# Patient Record
Sex: FEMALE | Race: OTHER | HISPANIC OR LATINO
[De-identification: names, ages, dates, MRNs, and addresses within clinical notes are randomized per-mention and may not be internally consistent; named-entity substitution may affect disease eponyms.]

---

## 2018-02-05 ENCOUNTER — APPOINTMENT (OUTPATIENT)
Dept: ANTEPARTUM | Facility: CLINIC | Age: 38
End: 2018-02-05
Payer: MEDICAID

## 2018-02-05 ENCOUNTER — ASOB RESULT (OUTPATIENT)
Age: 38
End: 2018-02-05

## 2018-02-05 PROBLEM — Z00.00 ENCOUNTER FOR PREVENTIVE HEALTH EXAMINATION: Status: ACTIVE | Noted: 2018-02-05

## 2018-02-05 PROCEDURE — 76816 OB US FOLLOW-UP PER FETUS: CPT

## 2018-03-15 ENCOUNTER — ASOB RESULT (OUTPATIENT)
Age: 38
End: 2018-03-15

## 2018-03-15 ENCOUNTER — APPOINTMENT (OUTPATIENT)
Dept: ANTEPARTUM | Facility: CLINIC | Age: 38
End: 2018-03-15
Payer: MEDICAID

## 2018-03-15 ENCOUNTER — OTHER (OUTPATIENT)
Age: 38
End: 2018-03-15

## 2018-03-15 PROCEDURE — 76811 OB US DETAILED SNGL FETUS: CPT

## 2018-03-15 PROCEDURE — 76817 TRANSVAGINAL US OBSTETRIC: CPT

## 2018-03-19 ENCOUNTER — APPOINTMENT (OUTPATIENT)
Dept: ANTEPARTUM | Facility: CLINIC | Age: 38
End: 2018-03-19

## 2018-04-06 ENCOUNTER — APPOINTMENT (OUTPATIENT)
Dept: PEDIATRIC CARDIOLOGY | Facility: CLINIC | Age: 38
End: 2018-04-06

## 2018-05-01 ENCOUNTER — APPOINTMENT (OUTPATIENT)
Dept: PEDIATRIC CARDIOLOGY | Facility: CLINIC | Age: 38
End: 2018-05-01
Payer: MEDICAID

## 2018-05-01 DIAGNOSIS — O09.529 SUPERVISION OF ELDERLY MULTIGRAVIDA, UNSPECIFIED TRIMESTER: ICD-10-CM

## 2018-05-01 DIAGNOSIS — O35.9XX0 MATERNAL CARE FOR (SUSPECTED) FETAL ABNORMALITY AND DAMAGE, UNSPECIFIED, NOT APPLICABLE OR UNSPECIFIED: ICD-10-CM

## 2018-05-01 DIAGNOSIS — Q27.8 OTHER SPECIFIED CONGENITAL MALFORMATIONS OF PERIPHERAL VASCULAR SYSTEM: ICD-10-CM

## 2018-05-01 PROCEDURE — 76825 ECHO EXAM OF FETAL HEART: CPT

## 2018-05-01 PROCEDURE — 76821 MIDDLE CEREBRAL ARTERY ECHO: CPT

## 2018-05-01 PROCEDURE — 76820 UMBILICAL ARTERY ECHO: CPT

## 2018-05-01 PROCEDURE — 76827 ECHO EXAM OF FETAL HEART: CPT

## 2018-05-01 PROCEDURE — 93325 DOPPLER ECHO COLOR FLOW MAPG: CPT | Mod: 59

## 2018-05-01 PROCEDURE — 99204 OFFICE O/P NEW MOD 45 MIN: CPT | Mod: 25

## 2018-05-01 RX ORDER — ELASTIC BANDAGE 2"X2.2YD
BANDAGE TOPICAL
Refills: 0 | Status: ACTIVE | COMMUNITY

## 2018-05-02 PROBLEM — Q27.8: Status: ACTIVE | Noted: 2018-05-02

## 2018-05-10 ENCOUNTER — ASOB RESULT (OUTPATIENT)
Age: 38
End: 2018-05-10

## 2018-05-10 ENCOUNTER — APPOINTMENT (OUTPATIENT)
Dept: ANTEPARTUM | Facility: CLINIC | Age: 38
End: 2018-05-10
Payer: MEDICAID

## 2018-05-10 PROCEDURE — 76819 FETAL BIOPHYS PROFIL W/O NST: CPT

## 2018-05-10 PROCEDURE — 76816 OB US FOLLOW-UP PER FETUS: CPT

## 2018-06-21 ENCOUNTER — ASOB RESULT (OUTPATIENT)
Age: 38
End: 2018-06-21

## 2018-06-21 ENCOUNTER — APPOINTMENT (OUTPATIENT)
Dept: ANTEPARTUM | Facility: CLINIC | Age: 38
End: 2018-06-21
Payer: MEDICAID

## 2018-06-21 PROCEDURE — 76816 OB US FOLLOW-UP PER FETUS: CPT

## 2018-06-21 PROCEDURE — 76819 FETAL BIOPHYS PROFIL W/O NST: CPT

## 2018-08-02 ENCOUNTER — OUTPATIENT (OUTPATIENT)
Dept: OUTPATIENT SERVICES | Facility: HOSPITAL | Age: 38
LOS: 1 days | End: 2018-08-02
Payer: MEDICAID

## 2018-08-02 ENCOUNTER — TRANSCRIPTION ENCOUNTER (OUTPATIENT)
Age: 38
End: 2018-08-02

## 2018-08-02 DIAGNOSIS — O35.9XX0 MATERNAL CARE FOR (SUSPECTED) FETAL ABNORMALITY AND DAMAGE, UNSPECIFIED, NOT APPLICABLE OR UNSPECIFIED: ICD-10-CM

## 2018-08-02 PROCEDURE — 76815 OB US LIMITED FETUS(S): CPT | Mod: 26

## 2018-08-02 PROCEDURE — 76819 FETAL BIOPHYS PROFIL W/O NST: CPT | Mod: 26

## 2018-08-02 PROCEDURE — 76819 FETAL BIOPHYS PROFIL W/O NST: CPT

## 2018-08-02 PROCEDURE — 76815 OB US LIMITED FETUS(S): CPT

## 2018-08-03 ENCOUNTER — TRANSCRIPTION ENCOUNTER (OUTPATIENT)
Age: 38
End: 2018-08-03

## 2018-08-03 ENCOUNTER — INPATIENT (INPATIENT)
Facility: HOSPITAL | Age: 38
LOS: 1 days | Discharge: HOME | End: 2018-08-05
Attending: OBSTETRICS & GYNECOLOGY | Admitting: OBSTETRICS & GYNECOLOGY
Payer: MEDICAID

## 2018-08-03 VITALS — WEIGHT: 165.35 LBS | HEIGHT: 61 IN

## 2018-08-03 DIAGNOSIS — O26.893 OTHER SPECIFIED PREGNANCY RELATED CONDITIONS, THIRD TRIMESTER: ICD-10-CM

## 2018-08-03 DIAGNOSIS — O47.1 FALSE LABOR AT OR AFTER 37 COMPLETED WEEKS OF GESTATION: ICD-10-CM

## 2018-08-03 LAB
ABO RH CONFIRMATION: SIGNIFICANT CHANGE UP
APPEARANCE UR: CLEAR — SIGNIFICANT CHANGE UP
BACTERIA # UR AUTO: ABNORMAL
BASE EXCESS BLDCOA CALC-SCNC: -4.9 MMOL/L — LOW (ref -2–2)
BASE EXCESS BLDCOV CALC-SCNC: -3.8 MMOL/L — LOW (ref -2–2)
BASOPHILS # BLD AUTO: 0 K/UL — SIGNIFICANT CHANGE UP (ref 0–0.2)
BASOPHILS NFR BLD AUTO: 0.4 % — SIGNIFICANT CHANGE UP (ref 0–2)
BILIRUB UR-MCNC: NEGATIVE — SIGNIFICANT CHANGE UP
BLD GP AB SCN SERPL QL: SIGNIFICANT CHANGE UP
COLOR SPEC: YELLOW — SIGNIFICANT CHANGE UP
DIFF PNL FLD: ABNORMAL
EOSINOPHIL # BLD AUTO: 0.1 K/UL — SIGNIFICANT CHANGE UP (ref 0–0.5)
EOSINOPHIL NFR BLD AUTO: 1.3 % — SIGNIFICANT CHANGE UP (ref 0–6)
EPI CELLS # UR: SIGNIFICANT CHANGE UP
GAS PNL BLDCOV: 7.37 — SIGNIFICANT CHANGE UP (ref 7.25–7.45)
GLUCOSE UR QL: NEGATIVE MG/DL — SIGNIFICANT CHANGE UP
HCO3 BLDCOA-SCNC: 20 MMOL/L — LOW (ref 21–29)
HCO3 BLDCOV-SCNC: 21 MMOL/L — SIGNIFICANT CHANGE UP (ref 21–29)
HCT VFR BLD CALC: 36.2 % — LOW (ref 37–47)
HGB BLD-MCNC: 12.2 G/DL — SIGNIFICANT CHANGE UP (ref 12–16)
KETONES UR-MCNC: NEGATIVE — SIGNIFICANT CHANGE UP
LEUKOCYTE ESTERASE UR-ACNC: NEGATIVE — SIGNIFICANT CHANGE UP
LYMPHOCYTES # BLD AUTO: 2.5 K/UL — SIGNIFICANT CHANGE UP (ref 1–4.8)
LYMPHOCYTES # BLD AUTO: 23.9 % — SIGNIFICANT CHANGE UP (ref 20–55)
MCHC RBC-ENTMCNC: 29 PG — SIGNIFICANT CHANGE UP (ref 27–31)
MCHC RBC-ENTMCNC: 33.7 G/DL — SIGNIFICANT CHANGE UP (ref 32–36)
MCV RBC AUTO: 86.2 FL — SIGNIFICANT CHANGE UP (ref 81–99)
MONOCYTES # BLD AUTO: 1.1 K/UL — HIGH (ref 0–0.8)
MONOCYTES NFR BLD AUTO: 10.2 % — HIGH (ref 3–10)
NEUTROPHILS # BLD AUTO: 6.5 K/UL — SIGNIFICANT CHANGE UP (ref 1.8–8)
NEUTROPHILS NFR BLD AUTO: 61.6 % — SIGNIFICANT CHANGE UP (ref 37–73)
NITRITE UR-MCNC: NEGATIVE — SIGNIFICANT CHANGE UP
PCO2 BLDCOA: 45.1 MMHG — SIGNIFICANT CHANGE UP (ref 32–68)
PCO2 BLDCOV: 36.7 MMHG — SIGNIFICANT CHANGE UP (ref 29–53)
PH BLDCOA: 7.29 — SIGNIFICANT CHANGE UP (ref 7.18–7.38)
PH UR: 6.5 — SIGNIFICANT CHANGE UP (ref 5–8)
PLATELET # BLD AUTO: 294 K/UL — SIGNIFICANT CHANGE UP (ref 150–400)
PO2 BLDCOA: 27.7 MMHG — SIGNIFICANT CHANGE UP (ref 5.7–30.5)
PO2 BLDCOA: 36.9 MMHG — SIGNIFICANT CHANGE UP (ref 17–41)
PROT UR-MCNC: NEGATIVE MG/DL — SIGNIFICANT CHANGE UP
RBC # BLD: 4.2 M/UL — LOW (ref 4.4–5.2)
RBC # FLD: 13.9 % — SIGNIFICANT CHANGE UP (ref 11–15.6)
RBC CASTS # UR COMP ASSIST: ABNORMAL /HPF (ref 0–4)
SAO2 % BLDCOA: SIGNIFICANT CHANGE UP
SAO2 % BLDCOV: SIGNIFICANT CHANGE UP
SP GR SPEC: 1.01 — SIGNIFICANT CHANGE UP (ref 1.01–1.02)
TYPE + AB SCN PNL BLD: SIGNIFICANT CHANGE UP
UROBILINOGEN FLD QL: NEGATIVE MG/DL — SIGNIFICANT CHANGE UP
WBC # BLD: 10.6 K/UL — SIGNIFICANT CHANGE UP (ref 4.8–10.8)
WBC # FLD AUTO: 10.6 K/UL — SIGNIFICANT CHANGE UP (ref 4.8–10.8)
WBC UR QL: SIGNIFICANT CHANGE UP

## 2018-08-03 RX ORDER — PRAMOXINE HYDROCHLORIDE 150 MG/15G
1 AEROSOL, FOAM RECTAL EVERY 4 HOURS
Qty: 0 | Refills: 0 | Status: DISCONTINUED | OUTPATIENT
Start: 2018-08-03 | End: 2018-08-05

## 2018-08-03 RX ORDER — SODIUM CHLORIDE 9 MG/ML
1000 INJECTION, SOLUTION INTRAVENOUS ONCE
Qty: 0 | Refills: 0 | Status: DISCONTINUED | OUTPATIENT
Start: 2018-08-03 | End: 2018-08-03

## 2018-08-03 RX ORDER — OXYTOCIN 10 UNIT/ML
333.33 VIAL (ML) INJECTION
Qty: 20 | Refills: 0 | Status: COMPLETED | OUTPATIENT
Start: 2018-08-03

## 2018-08-03 RX ORDER — SIMETHICONE 80 MG/1
80 TABLET, CHEWABLE ORAL EVERY 6 HOURS
Qty: 0 | Refills: 0 | Status: DISCONTINUED | OUTPATIENT
Start: 2018-08-03 | End: 2018-08-05

## 2018-08-03 RX ORDER — ACETAMINOPHEN 500 MG
650 TABLET ORAL EVERY 6 HOURS
Qty: 0 | Refills: 0 | Status: DISCONTINUED | OUTPATIENT
Start: 2018-08-03 | End: 2018-08-05

## 2018-08-03 RX ORDER — OXYCODONE AND ACETAMINOPHEN 5; 325 MG/1; MG/1
2 TABLET ORAL EVERY 6 HOURS
Qty: 0 | Refills: 0 | Status: DISCONTINUED | OUTPATIENT
Start: 2018-08-03 | End: 2018-08-05

## 2018-08-03 RX ORDER — LANOLIN
1 OINTMENT (GRAM) TOPICAL EVERY 6 HOURS
Qty: 0 | Refills: 0 | Status: DISCONTINUED | OUTPATIENT
Start: 2018-08-03 | End: 2018-08-05

## 2018-08-03 RX ORDER — DIPHENHYDRAMINE HCL 50 MG
25 CAPSULE ORAL EVERY 6 HOURS
Qty: 0 | Refills: 0 | Status: DISCONTINUED | OUTPATIENT
Start: 2018-08-03 | End: 2018-08-05

## 2018-08-03 RX ORDER — GLYCERIN ADULT
1 SUPPOSITORY, RECTAL RECTAL AT BEDTIME
Qty: 0 | Refills: 0 | Status: DISCONTINUED | OUTPATIENT
Start: 2018-08-03 | End: 2018-08-05

## 2018-08-03 RX ORDER — MAGNESIUM HYDROXIDE 400 MG/1
30 TABLET, CHEWABLE ORAL
Qty: 0 | Refills: 0 | Status: DISCONTINUED | OUTPATIENT
Start: 2018-08-03 | End: 2018-08-05

## 2018-08-03 RX ORDER — CITRIC ACID/SODIUM CITRATE 300-500 MG
30 SOLUTION, ORAL ORAL ONCE
Qty: 0 | Refills: 0 | Status: DISCONTINUED | OUTPATIENT
Start: 2018-08-03 | End: 2018-08-03

## 2018-08-03 RX ORDER — SODIUM CHLORIDE 9 MG/ML
3 INJECTION INTRAMUSCULAR; INTRAVENOUS; SUBCUTANEOUS EVERY 8 HOURS
Qty: 0 | Refills: 0 | Status: DISCONTINUED | OUTPATIENT
Start: 2018-08-03 | End: 2018-08-05

## 2018-08-03 RX ORDER — IBUPROFEN 200 MG
600 TABLET ORAL EVERY 6 HOURS
Qty: 0 | Refills: 0 | Status: DISCONTINUED | OUTPATIENT
Start: 2018-08-03 | End: 2018-08-05

## 2018-08-03 RX ORDER — HYDROCORTISONE 1 %
1 OINTMENT (GRAM) TOPICAL EVERY 4 HOURS
Qty: 0 | Refills: 0 | Status: DISCONTINUED | OUTPATIENT
Start: 2018-08-03 | End: 2018-08-05

## 2018-08-03 RX ORDER — OXYTOCIN 10 UNIT/ML
41.67 VIAL (ML) INJECTION
Qty: 20 | Refills: 0 | Status: DISCONTINUED | OUTPATIENT
Start: 2018-08-03 | End: 2018-08-05

## 2018-08-03 RX ORDER — SODIUM CHLORIDE 9 MG/ML
1000 INJECTION, SOLUTION INTRAVENOUS
Qty: 0 | Refills: 0 | Status: DISCONTINUED | OUTPATIENT
Start: 2018-08-03 | End: 2018-08-03

## 2018-08-03 RX ORDER — ONDANSETRON 8 MG/1
4 TABLET, FILM COATED ORAL EVERY 6 HOURS
Qty: 0 | Refills: 0 | Status: DISCONTINUED | OUTPATIENT
Start: 2018-08-03 | End: 2018-08-05

## 2018-08-03 RX ORDER — DOCUSATE SODIUM 100 MG
100 CAPSULE ORAL
Qty: 0 | Refills: 0 | Status: DISCONTINUED | OUTPATIENT
Start: 2018-08-03 | End: 2018-08-05

## 2018-08-03 RX ORDER — DIBUCAINE 1 %
1 OINTMENT (GRAM) RECTAL EVERY 4 HOURS
Qty: 0 | Refills: 0 | Status: DISCONTINUED | OUTPATIENT
Start: 2018-08-03 | End: 2018-08-05

## 2018-08-03 RX ORDER — OXYTOCIN 10 UNIT/ML
333.33 VIAL (ML) INJECTION
Qty: 20 | Refills: 0 | Status: DISCONTINUED | OUTPATIENT
Start: 2018-08-03 | End: 2018-08-05

## 2018-08-03 RX ORDER — TETANUS TOXOID, REDUCED DIPHTHERIA TOXOID AND ACELLULAR PERTUSSIS VACCINE, ADSORBED 5; 2.5; 8; 8; 2.5 [IU]/.5ML; [IU]/.5ML; UG/.5ML; UG/.5ML; UG/.5ML
0.5 SUSPENSION INTRAMUSCULAR ONCE
Qty: 0 | Refills: 0 | Status: DISCONTINUED | OUTPATIENT
Start: 2018-08-03 | End: 2018-08-05

## 2018-08-03 RX ORDER — AER TRAVELER 0.5 G/1
1 SOLUTION RECTAL; TOPICAL EVERY 4 HOURS
Qty: 0 | Refills: 0 | Status: DISCONTINUED | OUTPATIENT
Start: 2018-08-03 | End: 2018-08-05

## 2018-08-03 RX ORDER — NALOXONE HYDROCHLORIDE 4 MG/.1ML
0.1 SPRAY NASAL
Qty: 0 | Refills: 0 | Status: DISCONTINUED | OUTPATIENT
Start: 2018-08-03 | End: 2018-08-05

## 2018-08-03 RX ADMIN — Medication 600 MILLIGRAM(S): at 23:35

## 2018-08-03 RX ADMIN — Medication 1000 MILLIUNIT(S)/MIN: at 19:47

## 2018-08-03 RX ADMIN — Medication 600 MILLIGRAM(S): at 22:45

## 2018-08-03 RX ADMIN — SODIUM CHLORIDE 125 MILLILITER(S): 9 INJECTION, SOLUTION INTRAVENOUS at 08:17

## 2018-08-03 RX ADMIN — SODIUM CHLORIDE 125 MILLILITER(S): 9 INJECTION, SOLUTION INTRAVENOUS at 10:57

## 2018-08-04 LAB
BASOPHILS # BLD AUTO: 0 K/UL — SIGNIFICANT CHANGE UP (ref 0–0.2)
BASOPHILS NFR BLD AUTO: 0.1 % — SIGNIFICANT CHANGE UP (ref 0–2)
EOSINOPHIL # BLD AUTO: 0 K/UL — SIGNIFICANT CHANGE UP (ref 0–0.5)
EOSINOPHIL NFR BLD AUTO: 0.4 % — SIGNIFICANT CHANGE UP (ref 0–6)
HCT VFR BLD CALC: 33.3 % — LOW (ref 37–47)
HGB BLD-MCNC: 11 G/DL — LOW (ref 12–16)
LYMPHOCYTES # BLD AUTO: 17.7 % — LOW (ref 20–55)
LYMPHOCYTES # BLD AUTO: 2.4 K/UL — SIGNIFICANT CHANGE UP (ref 1–4.8)
MCHC RBC-ENTMCNC: 28.3 PG — SIGNIFICANT CHANGE UP (ref 27–31)
MCHC RBC-ENTMCNC: 33 G/DL — SIGNIFICANT CHANGE UP (ref 32–36)
MCV RBC AUTO: 85.6 FL — SIGNIFICANT CHANGE UP (ref 81–99)
MONOCYTES # BLD AUTO: 1.1 K/UL — HIGH (ref 0–0.8)
MONOCYTES NFR BLD AUTO: 8.5 % — SIGNIFICANT CHANGE UP (ref 3–10)
NEUTROPHILS # BLD AUTO: 9.6 K/UL — HIGH (ref 1.8–8)
NEUTROPHILS NFR BLD AUTO: 72 % — SIGNIFICANT CHANGE UP (ref 37–73)
PLATELET # BLD AUTO: 240 K/UL — SIGNIFICANT CHANGE UP (ref 150–400)
RBC # BLD: 3.89 M/UL — LOW (ref 4.4–5.2)
RBC # FLD: 13.7 % — SIGNIFICANT CHANGE UP (ref 11–15.6)
T PALLIDUM AB TITR SER: NEGATIVE — SIGNIFICANT CHANGE UP
WBC # BLD: 13.4 K/UL — HIGH (ref 4.8–10.8)
WBC # FLD AUTO: 13.4 K/UL — HIGH (ref 4.8–10.8)

## 2018-08-04 RX ORDER — IBUPROFEN 200 MG
1 TABLET ORAL
Qty: 0 | Refills: 0 | COMMUNITY

## 2018-08-04 RX ORDER — IBUPROFEN 200 MG
1 TABLET ORAL
Qty: 60 | Refills: 0 | OUTPATIENT
Start: 2018-08-04

## 2018-08-04 RX ADMIN — Medication 600 MILLIGRAM(S): at 12:10

## 2018-08-04 RX ADMIN — Medication 600 MILLIGRAM(S): at 11:13

## 2018-08-04 RX ADMIN — Medication 1 TABLET(S): at 11:13

## 2018-08-04 RX ADMIN — SODIUM CHLORIDE 3 MILLILITER(S): 9 INJECTION INTRAMUSCULAR; INTRAVENOUS; SUBCUTANEOUS at 06:26

## 2018-08-04 NOTE — DISCHARGE NOTE OB - PROVIDER TOKENS
FREE:[LAST:[[facility]],FIRST:[Constanza],PHONE:[(   )    -],FAX:[(   )    -],ADDRESS:[[facility] [place] [address]  Phone: [phone]]]

## 2018-08-04 NOTE — PROGRESS NOTE ADULT - SUBJECTIVE AND OBJECTIVE BOX
38y  s/p  at 40 wks and 2 days gestation PPD#1.   Patient seen and examined at bedside, no acute overnight events.   Patient is ambulating, +eating, +PO hydration, +voiding, +Flatus, -BM, +Formula feeding, +Breast feeding and pain is well controlled.  Denies headache, SOB, fever, chills and calf pain.    VS:   Vital Signs Last 24 Hrs  T(C): 37.5 (03 Aug 2018 21:30), Max: 37.5 (03 Aug 2018 21:30)  T(F): 99.5 (03 Aug 2018 21:30), Max: 99.5 (03 Aug 2018 21:30)  HR: 76 (03 Aug 2018 21:30) (76 - 88)  BP: 102/64 (03 Aug 2018 21:30) (102/64 - 135/78)  RR: 16 (03 Aug 2018 21:30) (16 - 18)    Physical Exam:  General: NAD  CVS: RRR, +S1/S2  Lungs: CTAB, no wheeze, ronchi or rales.   Breast: No tenderness or abnormal discharge.  Abdomen: +BS, soft, ND, minimally tender, Fundus firm at level of umbilicus.   Pelvic: Minimal lochia  Ext: No cyanosis, edema or calf tenderness.     Labs:                        12.2   10.6  )-----------( 294      ( 03 Aug 2018 11:26 )             36.2     Urinalysis Basic - ( 03 Aug 2018 11:26 )    Color: Yellow / Appearance: Clear / S.010 / pH: x  Gluc: x / Ketone: Negative  / Bili: Negative / Urobili: Negative mg/dL   Blood: x / Protein: Negative mg/dL / Nitrite: Negative   Leuk Esterase: Negative / RBC: 3-5 /HPF / WBC 0-2   Sq Epi: x / Non Sq Epi: Few / Bacteria: Few        Medication:  MEDICATIONS  (STANDING):  diphtheria/tetanus/pertussis (acellular) Vaccine (ADAcel) 0.5 milliLiter(s) IntraMuscular once  misoprostol Oral Solution 60 MICROGram(s) Oral every 2 hours  misoprostol Oral Solution 40 MICROGram(s) Oral every 2 hours  oxytocin Infusion 41.667 milliUNIT(s)/Min (125 mL/Hr) IV Continuous <Continuous>  oxytocin Infusion 333.333 milliUNIT(s)/Min (1000 mL/Hr) IV Continuous <Continuous>  prenatal multivitamin 1 Tablet(s) Oral daily  sodium chloride 0.9% lock flush 3 milliLiter(s) IV Push every 8 hours    MEDICATIONS  (PRN):  acetaminophen   Tablet 650 milliGRAM(s) Oral every 6 hours PRN For Temp greater than 38.5 C (101.3 F)  acetaminophen   Tablet. 650 milliGRAM(s) Oral every 6 hours PRN Mild Pain  dibucaine 1% Ointment 1 Application(s) Topical every 4 hours PRN Perineal Discomfort  diphenhydrAMINE   Capsule 25 milliGRAM(s) Oral every 6 hours PRN Itching  docusate sodium 100 milliGRAM(s) Oral two times a day PRN Stool Softening  glycerin Suppository - Adult 1 Suppository(s) Rectal at bedtime PRN Constipation  hydrocortisone 1% Cream 1 Application(s) Topical every 4 hours PRN Moderate to Severe Perineal Pain  ibuprofen  Tablet 600 milliGRAM(s) Oral every 6 hours PRN Moderate Pain  lanolin Ointment 1 Application(s) Topical every 6 hours PRN Sore Nipples  magnesium hydroxide Suspension 30 milliLiter(s) Oral two times a day PRN Constipation  naloxone Injectable 0.1 milliGRAM(s) IV Push every 3 minutes PRN For ANY of the following changes in patient status:  A. RR LESS THAN 10 breaths per minute, B. Oxygen saturation LESS THAN 90%, C. Sedation score of 6  ondansetron Injectable 4 milliGRAM(s) IV Push every 6 hours PRN Nausea  oxyCODONE    5 mG/acetaminophen 325 mG 2 Tablet(s) Oral every 6 hours PRN Severe Pain  pramoxine 1%/zinc 5% Cream 1 Application(s) Topical every 4 hours PRN Moderate to Severe Perineal Pain  simethicone 80 milliGRAM(s) Chew every 6 hours PRN Gas  witch hazel Pads 1 Application(s) Topical every 4 hours PRN Perineal Discomfort 38y  s/p  at 40 wks and 2 days gestation PPD#1.   Patient seen and examined at bedside, no acute overnight events.   Patient is ambulating, +eating, +PO hydration, +voiding, -Flatus, -BM, +Formula feeding, +Breast feeding and pain is well controlled. She reports mild vaginal bleeding having changed 2 pads overnight.   Denies headache, SOB, fever, chills and calf pain.    VS:   Vital Signs Last 24 Hrs  T(C): 37.5 (03 Aug 2018 21:30), Max: 37.5 (03 Aug 2018 21:30)  T(F): 99.5 (03 Aug 2018 21:30), Max: 99.5 (03 Aug 2018 21:30)  HR: 76 (03 Aug 2018 21:30) (76 - 88)  BP: 102/64 (03 Aug 2018 21:30) (102/64 - 135/78)  RR: 16 (03 Aug 2018 21:30) (16 - 18)    Physical Exam:  General: NAD  CVS: RRR, +S1/S2  Lungs: CTAB, no wheeze, ronchi or rales.   Breast: No tenderness or abnormal discharge.  Abdomen: +BS, soft, ND, minimally tender, Fundus firm at level of umbilicus.   Pelvic: Minimal lochia  Ext: No cyanosis, edema or calf tenderness.     Labs:                        12.2   10.6  )-----------( 294      ( 03 Aug 2018 11:26 )             36.2     Urinalysis Basic - ( 03 Aug 2018 11:26 )    Color: Yellow / Appearance: Clear / S.010 / pH: x  Gluc: x / Ketone: Negative  / Bili: Negative / Urobili: Negative mg/dL   Blood: x / Protein: Negative mg/dL / Nitrite: Negative   Leuk Esterase: Negative / RBC: 3-5 /HPF / WBC 0-2   Sq Epi: x / Non Sq Epi: Few / Bacteria: Few        Medication:  MEDICATIONS  (STANDING):  diphtheria/tetanus/pertussis (acellular) Vaccine (ADAcel) 0.5 milliLiter(s) IntraMuscular once  misoprostol Oral Solution 60 MICROGram(s) Oral every 2 hours  misoprostol Oral Solution 40 MICROGram(s) Oral every 2 hours  oxytocin Infusion 41.667 milliUNIT(s)/Min (125 mL/Hr) IV Continuous <Continuous>  oxytocin Infusion 333.333 milliUNIT(s)/Min (1000 mL/Hr) IV Continuous <Continuous>  prenatal multivitamin 1 Tablet(s) Oral daily  sodium chloride 0.9% lock flush 3 milliLiter(s) IV Push every 8 hours    MEDICATIONS  (PRN):  acetaminophen   Tablet 650 milliGRAM(s) Oral every 6 hours PRN For Temp greater than 38.5 C (101.3 F)  acetaminophen   Tablet. 650 milliGRAM(s) Oral every 6 hours PRN Mild Pain  dibucaine 1% Ointment 1 Application(s) Topical every 4 hours PRN Perineal Discomfort  diphenhydrAMINE   Capsule 25 milliGRAM(s) Oral every 6 hours PRN Itching  docusate sodium 100 milliGRAM(s) Oral two times a day PRN Stool Softening  glycerin Suppository - Adult 1 Suppository(s) Rectal at bedtime PRN Constipation  hydrocortisone 1% Cream 1 Application(s) Topical every 4 hours PRN Moderate to Severe Perineal Pain  ibuprofen  Tablet 600 milliGRAM(s) Oral every 6 hours PRN Moderate Pain  lanolin Ointment 1 Application(s) Topical every 6 hours PRN Sore Nipples  magnesium hydroxide Suspension 30 milliLiter(s) Oral two times a day PRN Constipation  naloxone Injectable 0.1 milliGRAM(s) IV Push every 3 minutes PRN For ANY of the following changes in patient status:  A. RR LESS THAN 10 breaths per minute, B. Oxygen saturation LESS THAN 90%, C. Sedation score of 6  ondansetron Injectable 4 milliGRAM(s) IV Push every 6 hours PRN Nausea  oxyCODONE    5 mG/acetaminophen 325 mG 2 Tablet(s) Oral every 6 hours PRN Severe Pain  pramoxine 1%/zinc 5% Cream 1 Application(s) Topical every 4 hours PRN Moderate to Severe Perineal Pain  simethicone 80 milliGRAM(s) Chew every 6 hours PRN Gas  witch hazel Pads 1 Application(s) Topical every 4 hours PRN Perineal Discomfort

## 2018-08-04 NOTE — DISCHARGE NOTE OB - PATIENT PORTAL LINK FT
You can access the [facility] Patient Portal, offered by [facility], by registering with the following website: [email or web address]

## 2018-08-04 NOTE — PROGRESS NOTE ADULT - PROBLEM SELECTOR PLAN 1
continue with routine postpartum care  encourage mother baby interaction  encourage exclusive breastfeeding  plan for discharge on PPD#2.

## 2018-08-04 NOTE — DISCHARGE NOTE OB - ADDITIONAL INSTRUCTIONS
To follow up at [facility] in 4 weeks for post partum check up. Diet and activity as tolerated.  Take medication as indicated

## 2018-08-04 NOTE — DISCHARGE NOTE OB - CARE PROVIDER_API CALL
STEVE Columbus  [address] [place] [address]  Phone: [phone]  Phone: (   )    -  Fax: (   )    -

## 2018-08-04 NOTE — DISCHARGE NOTE OB - PLAN OF CARE
Recovery To follow up at [facility] in 4 weeks for post partum check up. Diet and activity as tolerated.  Take medication as indicated

## 2018-08-04 NOTE — PROGRESS NOTE ADULT - ATTENDING COMMENTS
Post  day # 1  no complaints  exam wnl    Plan  cbc  other routine care  discussed contraception, vaccination, breastfeeding

## 2018-08-04 NOTE — DISCHARGE NOTE OB - HOSPITAL COURSE
This is a 39 yo  who experienced Normal spontaneous vaginal delivery at 40 weeks and 2 days gestation. Pregnancy course was uncomplicated. Labor and delivery course were unremarkable. Pt was transferred to postpartum floor after delivery and provided routine care. Pt was medically optimized for discharge and advised to return should any concerns arise.

## 2018-08-05 VITALS
RESPIRATION RATE: 18 BRPM | DIASTOLIC BLOOD PRESSURE: 82 MMHG | HEART RATE: 77 BPM | TEMPERATURE: 99 F | SYSTOLIC BLOOD PRESSURE: 113 MMHG

## 2018-08-05 PROCEDURE — 86850 RBC ANTIBODY SCREEN: CPT

## 2018-08-05 PROCEDURE — 85027 COMPLETE CBC AUTOMATED: CPT

## 2018-08-05 PROCEDURE — 36415 COLL VENOUS BLD VENIPUNCTURE: CPT

## 2018-08-05 PROCEDURE — G0463: CPT

## 2018-08-05 PROCEDURE — 81001 URINALYSIS AUTO W/SCOPE: CPT

## 2018-08-05 PROCEDURE — 86780 TREPONEMA PALLIDUM: CPT

## 2018-08-05 PROCEDURE — 86901 BLOOD TYPING SEROLOGIC RH(D): CPT

## 2018-08-05 PROCEDURE — 82803 BLOOD GASES ANY COMBINATION: CPT

## 2018-08-05 PROCEDURE — 59050 FETAL MONITOR W/REPORT: CPT

## 2018-08-05 PROCEDURE — 59025 FETAL NON-STRESS TEST: CPT

## 2018-08-05 PROCEDURE — T1013: CPT

## 2018-08-05 PROCEDURE — 86900 BLOOD TYPING SEROLOGIC ABO: CPT

## 2018-08-05 RX ORDER — IBUPROFEN 200 MG
1 TABLET ORAL
Qty: 60 | Refills: 0
Start: 2018-08-05

## 2018-08-05 RX ADMIN — Medication 600 MILLIGRAM(S): at 02:52

## 2018-08-05 RX ADMIN — SIMETHICONE 80 MILLIGRAM(S): 80 TABLET, CHEWABLE ORAL at 08:33

## 2018-08-05 RX ADMIN — Medication 100 MILLIGRAM(S): at 08:33

## 2018-08-05 RX ADMIN — Medication 600 MILLIGRAM(S): at 09:30

## 2018-08-05 RX ADMIN — Medication 600 MILLIGRAM(S): at 02:11

## 2018-08-05 RX ADMIN — Medication 600 MILLIGRAM(S): at 08:33

## 2018-08-05 NOTE — PROGRESS NOTE ADULT - SUBJECTIVE AND OBJECTIVE BOX
A/P 38y G P PPD# 2 s/p , stable    Vital Signs Last 24 Hrs  T(C): 36.9 (04 Aug 2018 20:25), Max: 37.2 (04 Aug 2018 08:14)  T(F): 98.4 (04 Aug 2018 20:25), Max: 98.9 (04 Aug 2018 08:14)  HR: 80 (04 Aug 2018 20:25) (78 - 80)  BP: 110/71 (04 Aug 2018 20:25) (110/71 - 116/74)  BP(mean): --  RR: 18 (04 Aug 2018 20:25) (16 - 18)  SpO2: --    PHYSICAL EXAM:    CHEST/LUNG: Clear to percussion bilaterally; No rales, rhonchi, wheezing, or rubs  HEART: Regular rate and rhythm; No murmurs, rubs, or gallops  BREASTS: Not engorged nipples everted  ABDOMEN: Soft, Nontender, Nondistended; Bowel sounds present  PERINEUM: Intact  and lochia minimal  EXTREMITIES:  2+ Peripheral Pulses, No clubbing, cyanosis, or edema    MEDICATIONS  (STANDING):  diphtheria/tetanus/pertussis (acellular) Vaccine (ADAcel) 0.5 milliLiter(s) IntraMuscular once  misoprostol Oral Solution 60 MICROGram(s) Oral every 2 hours  misoprostol Oral Solution 40 MICROGram(s) Oral every 2 hours  oxytocin Infusion 41.667 milliUNIT(s)/Min (125 mL/Hr) IV Continuous <Continuous>  oxytocin Infusion 333.333 milliUNIT(s)/Min (1000 mL/Hr) IV Continuous <Continuous>  prenatal multivitamin 1 Tablet(s) Oral daily  sodium chloride 0.9% lock flush 3 milliLiter(s) IV Push every 8 hours          LABS:                        11.0   13.4  )-----------( 240      ( 04 Aug 2018 07:02 )             33.3       1. Pain: well controlled   2. GI: Regular diet  3. : voiding  4. DVT prophylaxis: ambulate  5. Discharge home

## 2019-02-13 ENCOUNTER — APPOINTMENT (OUTPATIENT)
Age: 39
End: 2019-02-13
Payer: COMMERCIAL

## 2019-02-13 ENCOUNTER — ASOB RESULT (OUTPATIENT)
Age: 39
End: 2019-02-13

## 2019-02-13 PROCEDURE — 76801 OB US < 14 WKS SINGLE FETUS: CPT

## 2019-03-13 ENCOUNTER — APPOINTMENT (OUTPATIENT)
Dept: MATERNAL FETAL MEDICINE | Facility: CLINIC | Age: 39
End: 2019-03-13

## 2019-04-15 ENCOUNTER — ASOB RESULT (OUTPATIENT)
Age: 39
End: 2019-04-15

## 2019-04-15 ENCOUNTER — APPOINTMENT (OUTPATIENT)
Dept: ANTEPARTUM | Facility: CLINIC | Age: 39
End: 2019-04-15
Payer: MEDICAID

## 2019-04-15 PROCEDURE — 76811 OB US DETAILED SNGL FETUS: CPT

## 2019-07-08 ENCOUNTER — ASOB RESULT (OUTPATIENT)
Age: 39
End: 2019-07-08

## 2019-07-08 ENCOUNTER — APPOINTMENT (OUTPATIENT)
Age: 39
End: 2019-07-08
Payer: MEDICAID

## 2019-07-08 PROCEDURE — 76819 FETAL BIOPHYS PROFIL W/O NST: CPT

## 2019-07-08 PROCEDURE — 76816 OB US FOLLOW-UP PER FETUS: CPT

## 2019-08-05 ENCOUNTER — ASOB RESULT (OUTPATIENT)
Age: 39
End: 2019-08-05

## 2019-08-05 ENCOUNTER — APPOINTMENT (OUTPATIENT)
Dept: ANTEPARTUM | Facility: CLINIC | Age: 39
End: 2019-08-05
Payer: MEDICAID

## 2019-08-05 PROCEDURE — 76816 OB US FOLLOW-UP PER FETUS: CPT

## 2019-08-05 PROCEDURE — 76818 FETAL BIOPHYS PROFILE W/NST: CPT

## 2019-08-12 ENCOUNTER — APPOINTMENT (OUTPATIENT)
Dept: ANTEPARTUM | Facility: CLINIC | Age: 39
End: 2019-08-12
Payer: MEDICAID

## 2019-08-12 ENCOUNTER — ASOB RESULT (OUTPATIENT)
Age: 39
End: 2019-08-12

## 2019-08-12 PROCEDURE — 76818 FETAL BIOPHYS PROFILE W/NST: CPT

## 2019-08-19 ENCOUNTER — APPOINTMENT (OUTPATIENT)
Dept: ANTEPARTUM | Facility: CLINIC | Age: 39
End: 2019-08-19
Payer: MEDICAID

## 2019-08-19 ENCOUNTER — ASOB RESULT (OUTPATIENT)
Age: 39
End: 2019-08-19

## 2019-08-19 PROCEDURE — 76818 FETAL BIOPHYS PROFILE W/NST: CPT

## 2019-08-25 ENCOUNTER — INPATIENT (INPATIENT)
Facility: HOSPITAL | Age: 39
LOS: 1 days | Discharge: HOME | End: 2019-08-27
Attending: OBSTETRICS & GYNECOLOGY | Admitting: OBSTETRICS & GYNECOLOGY
Payer: MEDICAID

## 2019-08-25 ENCOUNTER — TRANSCRIPTION ENCOUNTER (OUTPATIENT)
Age: 39
End: 2019-08-25

## 2019-08-25 VITALS
TEMPERATURE: 98 F | DIASTOLIC BLOOD PRESSURE: 77 MMHG | RESPIRATION RATE: 17 BRPM | SYSTOLIC BLOOD PRESSURE: 136 MMHG | HEART RATE: 85 BPM | HEIGHT: 62 IN | WEIGHT: 169.98 LBS

## 2019-08-25 DIAGNOSIS — O47.1 FALSE LABOR AT OR AFTER 37 COMPLETED WEEKS OF GESTATION: ICD-10-CM

## 2019-08-25 DIAGNOSIS — O26.893 OTHER SPECIFIED PREGNANCY RELATED CONDITIONS, THIRD TRIMESTER: ICD-10-CM

## 2019-08-25 LAB
BASOPHILS # BLD AUTO: 0.05 K/UL — SIGNIFICANT CHANGE UP (ref 0–0.2)
BASOPHILS NFR BLD AUTO: 0.4 % — SIGNIFICANT CHANGE UP (ref 0–2)
BLD GP AB SCN SERPL QL: SIGNIFICANT CHANGE UP
EOSINOPHIL # BLD AUTO: 0.06 K/UL — SIGNIFICANT CHANGE UP (ref 0–0.5)
EOSINOPHIL NFR BLD AUTO: 0.4 % — SIGNIFICANT CHANGE UP (ref 0–6)
HCT VFR BLD CALC: 38 % — SIGNIFICANT CHANGE UP (ref 34.5–45)
HGB BLD-MCNC: 13 G/DL — SIGNIFICANT CHANGE UP (ref 11.5–15.5)
IMM GRANULOCYTES NFR BLD AUTO: 1.9 % — HIGH (ref 0–1.5)
LYMPHOCYTES # BLD AUTO: 16.6 % — SIGNIFICANT CHANGE UP (ref 13–44)
LYMPHOCYTES # BLD AUTO: 2.31 K/UL — SIGNIFICANT CHANGE UP (ref 1–3.3)
MCHC RBC-ENTMCNC: 30.4 PG — SIGNIFICANT CHANGE UP (ref 27–34)
MCHC RBC-ENTMCNC: 34.2 GM/DL — SIGNIFICANT CHANGE UP (ref 32–36)
MCV RBC AUTO: 89 FL — SIGNIFICANT CHANGE UP (ref 80–100)
MONOCYTES # BLD AUTO: 0.79 K/UL — SIGNIFICANT CHANGE UP (ref 0–0.9)
MONOCYTES NFR BLD AUTO: 5.7 % — SIGNIFICANT CHANGE UP (ref 2–14)
NEUTROPHILS # BLD AUTO: 10.46 K/UL — HIGH (ref 1.8–7.4)
NEUTROPHILS NFR BLD AUTO: 75 % — SIGNIFICANT CHANGE UP (ref 43–77)
PLATELET # BLD AUTO: 236 K/UL — SIGNIFICANT CHANGE UP (ref 150–400)
RBC # BLD: 4.27 M/UL — SIGNIFICANT CHANGE UP (ref 3.8–5.2)
RBC # FLD: 13.2 % — SIGNIFICANT CHANGE UP (ref 10.3–14.5)
WBC # BLD: 13.93 K/UL — HIGH (ref 3.8–10.5)
WBC # FLD AUTO: 13.93 K/UL — HIGH (ref 3.8–10.5)

## 2019-08-25 RX ORDER — CITRIC ACID/SODIUM CITRATE 300-500 MG
30 SOLUTION, ORAL ORAL ONCE
Refills: 0 | Status: DISCONTINUED | OUTPATIENT
Start: 2019-08-25 | End: 2019-08-25

## 2019-08-25 RX ORDER — ACETAMINOPHEN 500 MG
975 TABLET ORAL
Refills: 0 | Status: DISCONTINUED | OUTPATIENT
Start: 2019-08-26 | End: 2019-08-27

## 2019-08-25 RX ORDER — HYDROCORTISONE 1 %
1 OINTMENT (GRAM) TOPICAL EVERY 6 HOURS
Refills: 0 | Status: DISCONTINUED | OUTPATIENT
Start: 2019-08-25 | End: 2019-08-27

## 2019-08-25 RX ORDER — BENZOCAINE 10 %
1 GEL (GRAM) MUCOUS MEMBRANE EVERY 6 HOURS
Refills: 0 | Status: DISCONTINUED | OUTPATIENT
Start: 2019-08-25 | End: 2019-08-27

## 2019-08-25 RX ORDER — TETANUS TOXOID, REDUCED DIPHTHERIA TOXOID AND ACELLULAR PERTUSSIS VACCINE, ADSORBED 5; 2.5; 8; 8; 2.5 [IU]/.5ML; [IU]/.5ML; UG/.5ML; UG/.5ML; UG/.5ML
0.5 SUSPENSION INTRAMUSCULAR ONCE
Refills: 0 | Status: DISCONTINUED | OUTPATIENT
Start: 2019-08-25 | End: 2019-08-27

## 2019-08-25 RX ORDER — SODIUM CHLORIDE 9 MG/ML
3 INJECTION INTRAMUSCULAR; INTRAVENOUS; SUBCUTANEOUS EVERY 8 HOURS
Refills: 0 | Status: DISCONTINUED | OUTPATIENT
Start: 2019-08-25 | End: 2019-08-27

## 2019-08-25 RX ORDER — AER TRAVELER 0.5 G/1
1 SOLUTION RECTAL; TOPICAL EVERY 4 HOURS
Refills: 0 | Status: DISCONTINUED | OUTPATIENT
Start: 2019-08-25 | End: 2019-08-27

## 2019-08-25 RX ORDER — KETOROLAC TROMETHAMINE 30 MG/ML
30 SYRINGE (ML) INJECTION ONCE
Refills: 0 | Status: DISCONTINUED | OUTPATIENT
Start: 2019-08-25 | End: 2019-08-25

## 2019-08-25 RX ORDER — IBUPROFEN 200 MG
600 TABLET ORAL EVERY 6 HOURS
Refills: 0 | Status: COMPLETED | OUTPATIENT
Start: 2019-08-25 | End: 2020-07-23

## 2019-08-25 RX ORDER — DIBUCAINE 1 %
1 OINTMENT (GRAM) RECTAL EVERY 6 HOURS
Refills: 0 | Status: DISCONTINUED | OUTPATIENT
Start: 2019-08-25 | End: 2019-08-27

## 2019-08-25 RX ORDER — OXYTOCIN 10 UNIT/ML
333.33 VIAL (ML) INJECTION
Qty: 20 | Refills: 0 | Status: DISCONTINUED | OUTPATIENT
Start: 2019-08-25 | End: 2019-08-25

## 2019-08-25 RX ORDER — SIMETHICONE 80 MG/1
80 TABLET, CHEWABLE ORAL EVERY 4 HOURS
Refills: 0 | Status: DISCONTINUED | OUTPATIENT
Start: 2019-08-25 | End: 2019-08-27

## 2019-08-25 RX ORDER — OXYCODONE HYDROCHLORIDE 5 MG/1
5 TABLET ORAL
Refills: 0 | Status: DISCONTINUED | OUTPATIENT
Start: 2019-08-25 | End: 2019-08-27

## 2019-08-25 RX ORDER — OXYTOCIN 10 UNIT/ML
333.33 VIAL (ML) INJECTION
Qty: 20 | Refills: 0 | Status: DISCONTINUED | OUTPATIENT
Start: 2019-08-25 | End: 2019-08-27

## 2019-08-25 RX ORDER — OXYCODONE HYDROCHLORIDE 5 MG/1
5 TABLET ORAL ONCE
Refills: 0 | Status: DISCONTINUED | OUTPATIENT
Start: 2019-08-25 | End: 2019-08-27

## 2019-08-25 RX ORDER — DOCUSATE SODIUM 100 MG
100 CAPSULE ORAL
Refills: 0 | Status: DISCONTINUED | OUTPATIENT
Start: 2019-08-25 | End: 2019-08-27

## 2019-08-25 RX ORDER — PRAMOXINE HYDROCHLORIDE 150 MG/15G
1 AEROSOL, FOAM RECTAL EVERY 4 HOURS
Refills: 0 | Status: DISCONTINUED | OUTPATIENT
Start: 2019-08-25 | End: 2019-08-27

## 2019-08-25 RX ORDER — MAGNESIUM HYDROXIDE 400 MG/1
30 TABLET, CHEWABLE ORAL
Refills: 0 | Status: DISCONTINUED | OUTPATIENT
Start: 2019-08-25 | End: 2019-08-27

## 2019-08-25 RX ORDER — SODIUM CHLORIDE 9 MG/ML
1000 INJECTION, SOLUTION INTRAVENOUS
Refills: 0 | Status: DISCONTINUED | OUTPATIENT
Start: 2019-08-25 | End: 2019-08-25

## 2019-08-25 RX ORDER — DIPHENHYDRAMINE HCL 50 MG
25 CAPSULE ORAL EVERY 6 HOURS
Refills: 0 | Status: DISCONTINUED | OUTPATIENT
Start: 2019-08-25 | End: 2019-08-27

## 2019-08-25 RX ORDER — GLYCERIN ADULT
1 SUPPOSITORY, RECTAL RECTAL AT BEDTIME
Refills: 0 | Status: DISCONTINUED | OUTPATIENT
Start: 2019-08-25 | End: 2019-08-27

## 2019-08-25 RX ORDER — LANOLIN
1 OINTMENT (GRAM) TOPICAL EVERY 6 HOURS
Refills: 0 | Status: DISCONTINUED | OUTPATIENT
Start: 2019-08-25 | End: 2019-08-27

## 2019-08-25 RX ADMIN — Medication 30 MILLIGRAM(S): at 21:28

## 2019-08-25 RX ADMIN — SODIUM CHLORIDE 125 MILLILITER(S): 9 INJECTION, SOLUTION INTRAVENOUS at 20:09

## 2019-08-25 RX ADMIN — Medication 30 MILLIGRAM(S): at 21:49

## 2019-08-25 RX ADMIN — Medication 1000 MILLIUNIT(S)/MIN: at 21:20

## 2019-08-25 NOTE — OB PROVIDER H&P - ASSESSMENT
39 y/o  @ 39+ weeks admitted with active labor  prenatal care complicated by grand multip and HSV with no current flare up at this time    Pmhx- none  Pshx- none  Pobhx- 4 ft   Pgynhx- h/o HSV  Social- neg x 3  Nkda  Meds- prenatal vitamins and valtrex    fetal heart rate reactive with irregular contractions  GBS negative

## 2019-08-25 NOTE — OB PROVIDER DELIVERY SUMMARY - NSPROVIDERDELIVERYNOTE_OBGYN_ALL_OB_FT
patient with good maternal pushing effort. vertex delivered in BARRETT position. shoulders delivered atraumatically . delayed cord clamping was done and skin to skin was initiated. pitocin was started. placenta was delivered intact spontaneously. no laceration were noted.    male infant  apgar 9/9

## 2019-08-25 NOTE — OB RN DELIVERY SUMMARY - NS_SEPSISRSKCALC_OBGYN_ALL_OB_FT
EOS calculated successfully. EOS Risk Factor: 0.5/1000 live births ([facility] national incidence); GA=39w2d; Temp=98.1; ROM=0.133; GBS='Negative'; Antibiotics='No antibiotics or any antibiotics < 2 hrs prior to birth'

## 2019-08-26 ENCOUNTER — APPOINTMENT (OUTPATIENT)
Dept: ANTEPARTUM | Facility: CLINIC | Age: 39
End: 2019-08-26

## 2019-08-26 LAB
HCT VFR BLD CALC: 34.3 % — LOW (ref 34.5–45)
HGB BLD-MCNC: 11.7 G/DL — SIGNIFICANT CHANGE UP (ref 11.5–15.5)

## 2019-08-26 RX ORDER — IBUPROFEN 200 MG
600 TABLET ORAL EVERY 6 HOURS
Refills: 0 | Status: DISCONTINUED | OUTPATIENT
Start: 2019-08-26 | End: 2019-08-27

## 2019-08-26 RX ADMIN — Medication 975 MILLIGRAM(S): at 09:20

## 2019-08-26 RX ADMIN — Medication 975 MILLIGRAM(S): at 10:20

## 2019-08-26 RX ADMIN — Medication 975 MILLIGRAM(S): at 21:17

## 2019-08-26 RX ADMIN — Medication 975 MILLIGRAM(S): at 21:47

## 2019-08-26 RX ADMIN — Medication 975 MILLIGRAM(S): at 15:46

## 2019-08-26 RX ADMIN — Medication 975 MILLIGRAM(S): at 16:46

## 2019-08-26 RX ADMIN — Medication 600 MILLIGRAM(S): at 12:26

## 2019-08-26 RX ADMIN — Medication 600 MILLIGRAM(S): at 19:22

## 2019-08-26 RX ADMIN — Medication 600 MILLIGRAM(S): at 13:25

## 2019-08-26 RX ADMIN — Medication 1 TABLET(S): at 12:26

## 2019-08-26 RX ADMIN — Medication 975 MILLIGRAM(S): at 03:00

## 2019-08-26 RX ADMIN — SODIUM CHLORIDE 3 MILLILITER(S): 9 INJECTION INTRAMUSCULAR; INTRAVENOUS; SUBCUTANEOUS at 15:39

## 2019-08-26 RX ADMIN — Medication 975 MILLIGRAM(S): at 02:33

## 2019-08-26 RX ADMIN — Medication 600 MILLIGRAM(S): at 18:22

## 2019-08-26 NOTE — PROGRESS NOTE ADULT - ASSESSMENT
ARYAN  Ms. ROCK NUNES is a 39y year old  who is post-partum day#1  who delivered a male infant at 39w2d GA by vaginal delivery.        PLAN  1. Routine post-op care  2. Continue to encourage ambulation, PO intake and breastfeeding  3. DVT prophylaxis SCDs and ambulation  4. Continue pain management PRN.   5. Plan to discharge post-op day 2 ARYAN  Ms. ROCK NUNES is a 39y year old  who is post-partum day#1  who delivered a male infant at 39w2d GA by vaginal delivery. No acute overnight events       PLAN  1. Routine postpartum care  2. Continue to encourage ambulation, PO intake and breastfeeding  3. DVT prophylaxis SCDs and ambulation  4. Continue pain management PRN.   5. F/U AM Labs

## 2019-08-26 NOTE — DISCHARGE NOTE OB - CARE PROVIDER_API CALL
Jules Hoang)  Obstetrics and Gynecology  [address] [place] [address]  Phone: [phone]  Fax: [phone]  Follow Up Time:

## 2019-08-26 NOTE — PROGRESS NOTE ADULT - SUBJECTIVE AND OBJECTIVE BOX
Vaginal Delivery Progress Note    Ms. ROCK NUNES is a 39y year old  who is post-partum day#1  who delivered a male infant at 39w2d GA by vaginal delivery.     SUBJECTIVE:  No acute events overnight. Pain is well controlled with PRN pain medication. No problems with ambulating, voiding, or PO intake. Has not had flatus nor BM. Denies nausea and vomiting. Patient is having normal lochia, which is decreasing.    She is breastfeeding and the baby is latching on.       OBJECTIVE:  Physical exam:  Vital Signs Last 24 Hrs  T(C): 36.8 (25 Aug 2019 22:47), Max: 36.8 (25 Aug 2019 22:47)  T(F): 98.2 (25 Aug 2019 22:47), Max: 98.2 (25 Aug 2019 22:47)  HR: 85 (25 Aug 2019 22:47) (82 - 99)  BP: 114/69 (25 Aug 2019 22:47) (100/61 - 147/88)  RR: 18 (25 Aug 2019 22:47) (17 - 18)  SpO2: 96% (25 Aug 2019 22:47) (96% - 96%)    General: alert and oriented x3, no acute distress.  Heart: regular rate and rhythmn, no murmurs, rubs or gallops appreciated.  Lungs: clear to auscultation bilaterally.   breast: soft, no tenderness to palpation.  Abdomen: Soft, appropriately tender to palpitation, firm uterine fundus at umbilicus.  Extremities: no tenderness, redness or swelling in lower extremities bilaterally.     Labs:                         13.0   13.93 )-----------( 236      ( 25 Aug 2019 20:18 )             38.0 Vaginal Delivery Progress Note    Ms. ROCK NUNES is a 39y year old  who is post-partum day#1  who delivered a male infant at 39w2d GA by vaginal delivery.     SUBJECTIVE:  No acute events overnight. Pain is well controlled with PRN pain medication. No problems with ambulating, voiding, or PO intake. Has not had flatus nor BM. Denies nausea and vomiting. Patient is having normal lochia, which is decreasing.    She is breastfeeding and the baby is latching on.     Declines circumcision       OBJECTIVE:  Physical exam:  Vital Signs Last 24 Hrs  T(C): 36.8 (25 Aug 2019 22:47), Max: 36.8 (25 Aug 2019 22:47)  T(F): 98.2 (25 Aug 2019 22:47), Max: 98.2 (25 Aug 2019 22:47)  HR: 85 (25 Aug 2019 22:47) (82 - 99)  BP: 114/69 (25 Aug 2019 22:47) (100/61 - 147/88)  RR: 18 (25 Aug 2019 22:47) (17 - 18)  SpO2: 96% (25 Aug 2019 22:47) (96% - 96%)    General: alert and oriented x3, no acute distress.  Heart: regular rate and rhythmn, no murmurs, rubs or gallops appreciated.  Lungs: clear to auscultation bilaterally.   breast: soft, no tenderness to palpation.  Abdomen: Soft, appropriately tender to palpitation, firm uterine fundus at umbilicus.  Extremities: no tenderness, redness or swelling in lower extremities bilaterally.     Labs:                         13.0   13.93 )-----------( 236      ( 25 Aug 2019 20:18 )             38.0 Vaginal Delivery Progress Note    Ms. ROCK NUNES is a 39y year old  who is post-partum day#1  who delivered a male infant at 39w2d GA by vaginal delivery.     SUBJECTIVE:  No acute events overnight. Pain is well controlled with PRN pain medication. No problems with ambulating, voiding, or PO intake.   Has not had flatus nor BM. Denies nausea and vomiting. Patient is having normal lochia, which is decreasing.    She is breastfeeding and the baby is latching on.   Declines circumcision       OBJECTIVE:  Physical exam:  Vital Signs Last 24 Hrs  T(C): 36.8 (25 Aug 2019 22:47), Max: 36.8 (25 Aug 2019 22:47)  T(F): 98.2 (25 Aug 2019 22:47), Max: 98.2 (25 Aug 2019 22:47)  HR: 85 (25 Aug 2019 22:47) (82 - 99)  BP: 114/69 (25 Aug 2019 22:47) (100/61 - 147/88)  RR: 18 (25 Aug 2019 22:47) (17 - 18)  SpO2: 96% (25 Aug 2019 22:47) (96% - 96%)    General: alert and oriented x3, no acute distress.  Heart: regular rate and rhythm no murmurs, rubs or gallops appreciated.  Lungs: clear to auscultation bilaterally.   breast: soft, no tenderness to palpation.  Abdomen: Soft, appropriately tender to palpitation, firm uterine fundus at umbilicus.  VE: Moderate Lochia  Extremities: no tenderness, redness or swelling in lower extremities bilaterally.     Labs:                         13.0   13.93 )-----------( 236      ( 25 Aug 2019 20:18 )             38.0

## 2019-08-26 NOTE — DISCHARGE NOTE OB - PATIENT PORTAL LINK FT
You can access the [facility] Patient Portal, offered by [facility], by registering with the following website: [email or web address]

## 2019-08-26 NOTE — DISCHARGE NOTE OB - HOSPITAL COURSE
She is a 38yo now  who presented at [id number] to labor and delivery  and had a normal delivery. She had a normal postpartum course and was discharged home in stable condition on postpartum day 2.

## 2019-08-26 NOTE — DISCHARGE NOTE OB - MEDICATION SUMMARY - MEDICATIONS TO STOP TAKING
I will STOP taking the medications listed below when I get home from the hospital:    ibuprofen 600 mg oral tablet  -- 1 tab(s) by mouth every 6 hours, As Needed -for moderate pain

## 2019-08-26 NOTE — DISCHARGE NOTE OB - MEDICATION SUMMARY - MEDICATIONS TO TAKE
I will START or STAY ON the medications listed below when I get home from the hospital:    ibuprofen 600 mg oral tablet  -- 1 tab(s) by mouth every 6 hours MDD:4  -- Do not take this drug if you are pregnant.  It is very important that you take or use this exactly as directed.  Do not skip doses or discontinue unless directed by your doctor.  May cause drowsiness or dizziness.  Obtain medical advice before taking any non-prescription drugs as some may affect the action of this medication.  Take with food or milk.    -- Indication: For Moderate pain    Manjinder-Sequels 65 mg-25 mg oral tablet, extended release  -- 1 tab(s) by mouth once a day   -- Check with your doctor before becoming pregnant.  Do not take dairy products, antacids, or iron preparations within one hour of this medication.  May discolor urine or feces.  Obtain medical advice before taking any non-prescription drugs as some may affect the action of this medication.    -- Indication: For Anemia    docusate sodium 100 mg oral capsule  -- 1 cap(s) by mouth once a day   -- Medication should be taken with plenty of water.    -- Indication: For Constipation

## 2019-08-27 VITALS
TEMPERATURE: 98 F | RESPIRATION RATE: 18 BRPM | SYSTOLIC BLOOD PRESSURE: 108 MMHG | HEART RATE: 74 BPM | DIASTOLIC BLOOD PRESSURE: 63 MMHG

## 2019-08-27 LAB
MEV IGG SER-ACNC: >300 AU/ML — SIGNIFICANT CHANGE UP
MEV IGG+IGM SER-IMP: POSITIVE — SIGNIFICANT CHANGE UP
T PALLIDUM AB TITR SER: NEGATIVE — SIGNIFICANT CHANGE UP

## 2019-08-27 PROCEDURE — 36415 COLL VENOUS BLD VENIPUNCTURE: CPT

## 2019-08-27 PROCEDURE — 85027 COMPLETE CBC AUTOMATED: CPT

## 2019-08-27 PROCEDURE — 86780 TREPONEMA PALLIDUM: CPT

## 2019-08-27 PROCEDURE — G0463: CPT

## 2019-08-27 PROCEDURE — 59050 FETAL MONITOR W/REPORT: CPT

## 2019-08-27 PROCEDURE — 86901 BLOOD TYPING SEROLOGIC RH(D): CPT

## 2019-08-27 PROCEDURE — 85014 HEMATOCRIT: CPT

## 2019-08-27 PROCEDURE — 86765 RUBEOLA ANTIBODY: CPT

## 2019-08-27 PROCEDURE — 85018 HEMOGLOBIN: CPT

## 2019-08-27 PROCEDURE — 59025 FETAL NON-STRESS TEST: CPT

## 2019-08-27 PROCEDURE — 86850 RBC ANTIBODY SCREEN: CPT

## 2019-08-27 PROCEDURE — 86900 BLOOD TYPING SEROLOGIC ABO: CPT

## 2019-08-27 PROCEDURE — T1013: CPT

## 2019-08-27 RX ORDER — IBUPROFEN 200 MG
1 TABLET ORAL
Qty: 16 | Refills: 0
Start: 2019-08-27 | End: 2019-08-30

## 2019-08-27 RX ORDER — DOCUSATE SODIUM 100 MG
1 CAPSULE ORAL
Qty: 30 | Refills: 0
Start: 2019-08-27 | End: 2019-09-25

## 2019-08-27 RX ADMIN — Medication 600 MILLIGRAM(S): at 05:35

## 2019-08-27 RX ADMIN — Medication 975 MILLIGRAM(S): at 10:00

## 2019-08-27 RX ADMIN — Medication 975 MILLIGRAM(S): at 09:02

## 2019-08-27 RX ADMIN — Medication 600 MILLIGRAM(S): at 06:05

## 2019-08-27 RX ADMIN — Medication 600 MILLIGRAM(S): at 00:35

## 2019-08-27 RX ADMIN — Medication 600 MILLIGRAM(S): at 01:05

## 2019-08-27 NOTE — PROGRESS NOTE ADULT - SUBJECTIVE AND OBJECTIVE BOX
Vaginal Delivery Progress Note    Ms. ROCK NUNES is a 39y year old  who is post-partum day#2  who delivered a male infant at 39w2d GA by vaginal delivery.     SUBJECTIVE:  No acute events overnight. Pain is well controlled with PRN pain medication. No problems with ambulating, voiding, or PO intake. Has not had flatus nor BM. Denies nausea and vomiting. Patient is having normal lochia, which is decreasing.    She is breastfeeding and the baby is latching on.     Patient denies male infant circumscicion.    OBJECTIVE:  Physical exam:  Vital Signs Last 24 Hrs  T(C): 37 (26 Aug 2019 21:00), Max: 37.1 (26 Aug 2019 08:26)  T(F): 98.6 (26 Aug 2019 21:00), Max: 98.8 (26 Aug 2019 08:26)  HR: 71 (26 Aug 2019 21:00) (71 - 77)  BP: 113/73 (26 Aug 2019 21:00) (112/69 - 113/73)  BP(mean): --  RR: 18 (26 Aug 2019 21:00) (18 - 18)  SpO2: 98% (26 Aug 2019 21:00) (98% - 98%)    General: alert and oriented x3, no acute distress.  Heart: regular rate and rhythmn, no murmurs, rubs or gallops appreciated.  Lungs: clear to auscultation bilaterally.   breast: soft, no tenderness to palpation.  Abdomen: Soft, appropriately tender to palpitation, firm uterine fundus at umbilicus.  Extremities: no tenderness, redness or swelling in lower extremities bilaterally.     Labs:                         11.7   x     )-----------( x        ( 26 Aug 2019 06:35 )             34.3 Vaginal Delivery Progress Note    Ms. ROCK NUNES is a 39y year old  who is post-partum day#2  who delivered a male infant at 39w2d GA by vaginal delivery.     SUBJECTIVE:  No acute events overnight. Pain is well controlled with PRN pain medication. No problems with ambulating, voiding, or PO intake. Has not had flatus nor BM. Denies nausea and vomiting. Patient is having normal lochia, which is decreasing.    She is breastfeeding and the baby is latching on.     Patient denies male infant circumscicion.    OBJECTIVE:  Physical exam:  Vital Signs Last 24 Hrs  T(C): 37 (26 Aug 2019 21:00), Max: 37.1 (26 Aug 2019 08:26)  T(F): 98.6 (26 Aug 2019 21:00), Max: 98.8 (26 Aug 2019 08:26)  HR: 71 (26 Aug 2019 21:00) (71 - 77)  BP: 113/73 (26 Aug 2019 21:00) (112/69 - 113/73)  RR: 18 (26 Aug 2019 21:00) (18 - 18)  SpO2: 98% (26 Aug 2019 21:00) (98% - 98%)    General: alert and oriented x3, no acute distress.  Heart: regular rate and rhythmn, no murmurs, rubs or gallops appreciated.  Lungs: clear to auscultation bilaterally.   breast: soft, no tenderness to palpation.  Abdomen: Soft, appropriately tender to palpitation, firm uterine fundus at umbilicus.  Extremities: no tenderness, redness or swelling in lower extremities bilaterally.     Labs:                         11.7   x     )-----------( x        ( 26 Aug 2019 06:35 )             34.3

## 2019-08-27 NOTE — PROGRESS NOTE ADULT - ATTENDING COMMENTS
post  day # 2  no complaints  exam wnl  labs reviewed    cleared for dc  discussed contraception, vaccination, breastfeeding  follow up for post partum visit

## 2019-08-27 NOTE — PROGRESS NOTE ADULT - ASSESSMENT
ARYAN  Ms. ROCK NUNES is a 39y year old  who is post-partum day#2  who delivered a male infant at 39w2d GA by vaginal delivery.     PLAN  1. Routine post-op care  2. Continue to encourage ambulation, PO intake and breastfeeding  3. DVT prophylaxis SCDs and ambulation  4. Continue pain management PRN.   5. Plan to discharge post-op day 2

## 2020-06-29 ENCOUNTER — APPOINTMENT (OUTPATIENT)
Dept: OTOLARYNGOLOGY | Facility: CLINIC | Age: 40
End: 2020-06-29

## 2022-09-30 NOTE — DISCHARGE NOTE OB - CARE PLAN
Patient requests all Lab, Cardiology, and Radiology Results on their Discharge Instructions
Principal Discharge DX:	 (normal spontaneous vaginal delivery)  Goal:	Recovery  Assessment and plan of treatment:	To follow up at [facility] in 4 weeks for post partum check up. Diet and activity as tolerated.  Take medication as indicated

## 2024-05-09 NOTE — DISCHARGE NOTE OB - CARE PROVIDERS DIRECT ADDRESSES
----- Message from MARINA Bar sent at 5/9/2024 12:44 PM CDT -----  Request for patient virtual visit to be cancelled for unable to reach .  Please Call to Triage patient complaint, and notify that the visit will be cancelled and the patient will not be charged.      ,DirectAddress_Unknown

## 2024-05-16 NOTE — OB PROVIDER H&P - PRO FEEDING PLAN INFANT OB
How Severe Are Your Spot(S)?: mild
What Is The Reason For Today's Visit?: Full Body Skin Examination
What Is The Reason For Today's Visit? (Being Monitored For X): concerning skin lesions on an annual basis
breast and formula feeding